# Patient Record
Sex: FEMALE | Race: WHITE | NOT HISPANIC OR LATINO
[De-identification: names, ages, dates, MRNs, and addresses within clinical notes are randomized per-mention and may not be internally consistent; named-entity substitution may affect disease eponyms.]

---

## 2017-11-29 ENCOUNTER — APPOINTMENT (OUTPATIENT)
Dept: OTOLARYNGOLOGY | Facility: CLINIC | Age: 42
End: 2017-11-29
Payer: COMMERCIAL

## 2017-11-29 VITALS
SYSTOLIC BLOOD PRESSURE: 133 MMHG | DIASTOLIC BLOOD PRESSURE: 82 MMHG | BODY MASS INDEX: 39.27 KG/M2 | WEIGHT: 200 LBS | HEIGHT: 60 IN

## 2017-11-29 DIAGNOSIS — H91.91 UNSPECIFIED HEARING LOSS, RIGHT EAR: ICD-10-CM

## 2017-11-29 DIAGNOSIS — R42 DIZZINESS AND GIDDINESS: ICD-10-CM

## 2017-11-29 DIAGNOSIS — Z82.2 FAMILY HISTORY OF DEAFNESS AND HEARING LOSS: ICD-10-CM

## 2017-11-29 DIAGNOSIS — Z83.3 FAMILY HISTORY OF DIABETES MELLITUS: ICD-10-CM

## 2017-11-29 PROCEDURE — 99204 OFFICE O/P NEW MOD 45 MIN: CPT | Mod: 25

## 2017-11-29 PROCEDURE — 92557 COMPREHENSIVE HEARING TEST: CPT

## 2017-11-29 PROCEDURE — 92567 TYMPANOMETRY: CPT

## 2017-11-29 RX ORDER — ASPIRIN 325 MG/1
325 TABLET ORAL
Refills: 0 | Status: ACTIVE | COMMUNITY

## 2017-12-02 ENCOUNTER — FORM ENCOUNTER (OUTPATIENT)
Age: 42
End: 2017-12-02

## 2017-12-03 ENCOUNTER — APPOINTMENT (OUTPATIENT)
Dept: CT IMAGING | Facility: IMAGING CENTER | Age: 42
End: 2017-12-03
Payer: COMMERCIAL

## 2017-12-03 ENCOUNTER — OUTPATIENT (OUTPATIENT)
Dept: OUTPATIENT SERVICES | Facility: HOSPITAL | Age: 42
LOS: 1 days | End: 2017-12-03
Payer: COMMERCIAL

## 2017-12-03 DIAGNOSIS — H71.21 CHOLESTEATOMA OF MASTOID, RIGHT EAR: ICD-10-CM

## 2017-12-03 PROCEDURE — 70480 CT ORBIT/EAR/FOSSA W/O DYE: CPT | Mod: 26

## 2017-12-03 PROCEDURE — 70480 CT ORBIT/EAR/FOSSA W/O DYE: CPT

## 2017-12-04 ENCOUNTER — TRANSCRIPTION ENCOUNTER (OUTPATIENT)
Age: 42
End: 2017-12-04

## 2017-12-08 ENCOUNTER — RESULT REVIEW (OUTPATIENT)
Age: 42
End: 2017-12-08

## 2017-12-15 ENCOUNTER — APPOINTMENT (OUTPATIENT)
Dept: OTOLARYNGOLOGY | Facility: CLINIC | Age: 42
End: 2017-12-15

## 2017-12-28 ENCOUNTER — APPOINTMENT (OUTPATIENT)
Dept: OTOLARYNGOLOGY | Facility: CLINIC | Age: 42
End: 2017-12-28
Payer: COMMERCIAL

## 2017-12-28 PROCEDURE — 92537 CALORIC VSTBLR TEST W/REC: CPT

## 2017-12-28 PROCEDURE — 92547 SUPPLEMENTAL ELECTRICAL TEST: CPT

## 2017-12-28 PROCEDURE — 92540 BASIC VESTIBULAR EVALUATION: CPT

## 2017-12-28 PROCEDURE — 92567 TYMPANOMETRY: CPT

## 2018-01-11 ENCOUNTER — MESSAGE (OUTPATIENT)
Age: 43
End: 2018-01-11

## 2018-01-22 ENCOUNTER — APPOINTMENT (OUTPATIENT)
Dept: OTOLARYNGOLOGY | Facility: CLINIC | Age: 43
End: 2018-01-22
Payer: COMMERCIAL

## 2018-01-22 VITALS
HEART RATE: 85 BPM | DIASTOLIC BLOOD PRESSURE: 72 MMHG | SYSTOLIC BLOOD PRESSURE: 106 MMHG | HEIGHT: 60 IN | BODY MASS INDEX: 39.27 KG/M2 | WEIGHT: 200 LBS

## 2018-01-22 PROCEDURE — 99214 OFFICE O/P EST MOD 30 MIN: CPT | Mod: 25

## 2018-01-22 PROCEDURE — 92567 TYMPANOMETRY: CPT

## 2018-01-22 PROCEDURE — 92557 COMPREHENSIVE HEARING TEST: CPT

## 2018-01-22 RX ORDER — CITALOPRAM 10 MG/1
TABLET, FILM COATED ORAL
Refills: 0 | Status: ACTIVE | COMMUNITY

## 2018-02-14 ENCOUNTER — APPOINTMENT (OUTPATIENT)
Dept: OTOLARYNGOLOGY | Facility: CLINIC | Age: 43
End: 2018-02-14
Payer: COMMERCIAL

## 2018-02-14 ENCOUNTER — OUTPATIENT (OUTPATIENT)
Dept: OUTPATIENT SERVICES | Facility: HOSPITAL | Age: 43
LOS: 1 days | End: 2018-02-14

## 2018-02-14 VITALS
WEIGHT: 195.99 LBS | OXYGEN SATURATION: 98 % | TEMPERATURE: 98 F | DIASTOLIC BLOOD PRESSURE: 80 MMHG | SYSTOLIC BLOOD PRESSURE: 110 MMHG | RESPIRATION RATE: 16 BRPM | HEART RATE: 77 BPM | HEIGHT: 60 IN

## 2018-02-14 DIAGNOSIS — Z98.890 OTHER SPECIFIED POSTPROCEDURAL STATES: Chronic | ICD-10-CM

## 2018-02-14 DIAGNOSIS — H71.21 CHOLESTEATOMA OF MASTOID, RIGHT EAR: ICD-10-CM

## 2018-02-14 DIAGNOSIS — G47.33 OBSTRUCTIVE SLEEP APNEA (ADULT) (PEDIATRIC): ICD-10-CM

## 2018-02-14 DIAGNOSIS — Z98.891 HISTORY OF UTERINE SCAR FROM PREVIOUS SURGERY: Chronic | ICD-10-CM

## 2018-02-14 LAB
BUN SERPL-MCNC: 22 MG/DL — SIGNIFICANT CHANGE UP (ref 7–23)
CALCIUM SERPL-MCNC: 8.8 MG/DL — SIGNIFICANT CHANGE UP (ref 8.4–10.5)
CHLORIDE SERPL-SCNC: 101 MMOL/L — SIGNIFICANT CHANGE UP (ref 98–107)
CO2 SERPL-SCNC: 23 MMOL/L — SIGNIFICANT CHANGE UP (ref 22–31)
CREAT SERPL-MCNC: 0.75 MG/DL — SIGNIFICANT CHANGE UP (ref 0.5–1.3)
GLUCOSE SERPL-MCNC: 130 MG/DL — HIGH (ref 70–99)
HCG SERPL-ACNC: < 5 MIU/ML — SIGNIFICANT CHANGE UP
HCT VFR BLD CALC: 35.3 % — SIGNIFICANT CHANGE UP (ref 34.5–45)
HGB BLD-MCNC: 12.1 G/DL — SIGNIFICANT CHANGE UP (ref 11.5–15.5)
MCHC RBC-ENTMCNC: 30.2 PG — SIGNIFICANT CHANGE UP (ref 27–34)
MCHC RBC-ENTMCNC: 34.3 % — SIGNIFICANT CHANGE UP (ref 32–36)
MCV RBC AUTO: 88 FL — SIGNIFICANT CHANGE UP (ref 80–100)
NRBC # FLD: 0 — SIGNIFICANT CHANGE UP
PLATELET # BLD AUTO: 274 K/UL — SIGNIFICANT CHANGE UP (ref 150–400)
PMV BLD: 10.1 FL — SIGNIFICANT CHANGE UP (ref 7–13)
POTASSIUM SERPL-MCNC: 3.8 MMOL/L — SIGNIFICANT CHANGE UP (ref 3.5–5.3)
POTASSIUM SERPL-SCNC: 3.8 MMOL/L — SIGNIFICANT CHANGE UP (ref 3.5–5.3)
RBC # BLD: 4.01 M/UL — SIGNIFICANT CHANGE UP (ref 3.8–5.2)
RBC # FLD: 13.1 % — SIGNIFICANT CHANGE UP (ref 10.3–14.5)
SODIUM SERPL-SCNC: 139 MMOL/L — SIGNIFICANT CHANGE UP (ref 135–145)
WBC # BLD: 8.56 K/UL — SIGNIFICANT CHANGE UP (ref 3.8–10.5)
WBC # FLD AUTO: 8.56 K/UL — SIGNIFICANT CHANGE UP (ref 3.8–10.5)

## 2018-02-14 PROCEDURE — 99213 OFFICE O/P EST LOW 20 MIN: CPT

## 2018-02-14 NOTE — H&P PST ADULT - FAMILY HISTORY
Sibling  Still living? Yes, Estimated age: 31-40  Family history of breast cancer in sister, Age at diagnosis: Age Unknown Sibling  Still living? Yes, Estimated age: 31-40  Family history of breast cancer in sister, Age at diagnosis: Age Unknown     Mother  Still living? No  Family history of MI (myocardial infarction), Age at diagnosis: Age Unknown  Family history of heart disease, Age at diagnosis: Age Unknown     Father  Still living? Yes, Estimated age: 61-70  Type 2 diabetes mellitus, Age at diagnosis: Age Unknown

## 2018-02-14 NOTE — H&P PST ADULT - RS GEN PE MLT RESP DETAILS PC
respirations non-labored/no intercostal retractions/clear to auscultation bilaterally/no rales/no chest wall tenderness/no rhonchi/breath sounds equal/airway patent/no subcutaneous emphysema/good air movement/no wheezes

## 2018-02-14 NOTE — H&P PST ADULT - ABILITY TO HEAR (WITH HEARING AID OR HEARING APPLIANCE IF NORMALLY USED):
right/Mildly to Moderately Impaired: difficulty hearing in some environments or speaker may need to increase volume or speak distinctly

## 2018-02-14 NOTE — H&P PST ADULT - NEGATIVE PSYCHIATRIC SYMPTOMS
no memory loss/no mood swings/no agitation/no suicidal ideation/no paranoia/no visual hallucinations/no depression/no insomnia/no auditory hallucinations

## 2018-02-14 NOTE — H&P PST ADULT - NEGATIVE ENMT SYMPTOMS
no abnormal taste sensation/no throat pain/no dysphagia/no nose bleeds/no recurrent cold sores/no dry mouth/no sinus symptoms/no nasal congestion no nose bleeds/no dry mouth/no throat pain/no dysphagia/no recurrent cold sores/no abnormal taste sensation/no sinus symptoms/no nasal congestion/no vertigo

## 2018-02-14 NOTE — H&P PST ADULT - PSH
H/O colonoscopy  2007  H/O dilation and curettage  x5 secondary to miscarriages  H/O endoscopy  2017  H/O sinus surgery  10/2005  History of  section  1005  S/P LASIK surgery of both eyes

## 2018-02-14 NOTE — H&P PST ADULT - NEGATIVE CARDIOVASCULAR SYMPTOMS
no palpitations/no orthopnea/no chest pain/no claudication/no peripheral edema/no dyspnea on exertion/no paroxysmal nocturnal dyspnea

## 2018-02-14 NOTE — H&P PST ADULT - GASTROINTESTINAL DETAILS
bowel sounds normal/soft/no bruit/no rebound tenderness/nontender/no distention/no masses palpable soft/no rigidity/no organomegaly/no rebound tenderness/no masses palpable/bowel sounds normal/no bruit/nontender/no distention/no guarding

## 2018-02-14 NOTE — H&P PST ADULT - HISTORY OF PRESENT ILLNESS
intermittent right ear pain since 2015, constant   S/P Ct scan of head11/2017. 43 y/o  female with PMH: SHOBHA, Obesity, GERD presents to PST for pre op evaluation with hx of intermittent right ear pain since 2015. Pt stated that pain has gradually gotten constant. Pt was evaluated by surgeon. S/P Ct scan of head on 11/2017. Now scheduled for right mastoidectomy with ossicular chain reconstruction with fascia graft, tympanoplasty on 02/21/18

## 2018-02-14 NOTE — H&P PST ADULT - PMH
Cholesteatoma of mastoid, right ear    GERD (gastroesophageal reflux disease)    Obesity (BMI 30-39.9)    SHOBHA (obstructive sleep apnea)

## 2018-02-20 ENCOUNTER — TRANSCRIPTION ENCOUNTER (OUTPATIENT)
Age: 43
End: 2018-02-20

## 2018-02-21 ENCOUNTER — OUTPATIENT (OUTPATIENT)
Dept: OUTPATIENT SERVICES | Facility: HOSPITAL | Age: 43
LOS: 1 days | Discharge: HOME | End: 2018-02-21
Payer: COMMERCIAL

## 2018-02-21 ENCOUNTER — APPOINTMENT (OUTPATIENT)
Dept: OTOLARYNGOLOGY | Facility: HOSPITAL | Age: 43
End: 2018-02-21

## 2018-02-21 ENCOUNTER — RESULT REVIEW (OUTPATIENT)
Age: 43
End: 2018-02-21

## 2018-02-21 VITALS
RESPIRATION RATE: 14 BRPM | HEART RATE: 82 BPM | SYSTOLIC BLOOD PRESSURE: 92 MMHG | OXYGEN SATURATION: 97 % | DIASTOLIC BLOOD PRESSURE: 71 MMHG

## 2018-02-21 VITALS
WEIGHT: 195.99 LBS | HEIGHT: 60 IN | RESPIRATION RATE: 18 BRPM | HEART RATE: 78 BPM | DIASTOLIC BLOOD PRESSURE: 49 MMHG | TEMPERATURE: 98 F | SYSTOLIC BLOOD PRESSURE: 121 MMHG | OXYGEN SATURATION: 100 %

## 2018-02-21 DIAGNOSIS — Z98.890 OTHER SPECIFIED POSTPROCEDURAL STATES: Chronic | ICD-10-CM

## 2018-02-21 DIAGNOSIS — Z98.891 HISTORY OF UTERINE SCAR FROM PREVIOUS SURGERY: Chronic | ICD-10-CM

## 2018-02-21 DIAGNOSIS — H71.21 CHOLESTEATOMA OF MASTOID, RIGHT EAR: ICD-10-CM

## 2018-02-21 PROCEDURE — 88300 SURGICAL PATH GROSS: CPT | Mod: 26,59

## 2018-02-21 PROCEDURE — 69644 REVISE MIDDLE EAR & MASTOID: CPT | Mod: RT

## 2018-02-21 PROCEDURE — 92516 FACIAL NERVE FUNCTION TEST: CPT | Mod: RT

## 2018-02-21 PROCEDURE — 20926: CPT | Mod: RT

## 2018-02-21 PROCEDURE — 88304 TISSUE EXAM BY PATHOLOGIST: CPT | Mod: 26

## 2018-02-21 RX ORDER — AZITHROMYCIN 500 MG/1
1 TABLET, FILM COATED ORAL
Qty: 6 | Refills: 0
Start: 2018-02-21 | End: 2018-02-25

## 2018-02-21 RX ORDER — ACETAMINOPHEN WITH CODEINE 300MG-30MG
1 TABLET ORAL
Qty: 20 | Refills: 0
Start: 2018-02-21 | End: 2018-02-25

## 2018-02-21 RX ORDER — ACETAMINOPHEN WITH CODEINE 300MG-30MG
1 TABLET ORAL
Qty: 20 | Refills: 0 | OUTPATIENT
Start: 2018-02-21 | End: 2018-02-25

## 2018-02-21 NOTE — ASU DISCHARGE PLAN (ADULT/PEDIATRIC). - NOTIFY
call for fever of 100.8 or greater/Pain not relieved by Medications/Swelling that continues/Numbness, color, or temperature change to extremity/Bleeding that does not stop/Unable to Urinate/Persistent Nausea and Vomiting

## 2018-02-27 LAB — SURGICAL PATHOLOGY STUDY: SIGNIFICANT CHANGE UP

## 2018-03-05 ENCOUNTER — APPOINTMENT (OUTPATIENT)
Dept: OTOLARYNGOLOGY | Facility: CLINIC | Age: 43
End: 2018-03-05
Payer: COMMERCIAL

## 2018-03-05 VITALS
HEIGHT: 60 IN | BODY MASS INDEX: 38.48 KG/M2 | HEART RATE: 87 BPM | SYSTOLIC BLOOD PRESSURE: 109 MMHG | DIASTOLIC BLOOD PRESSURE: 72 MMHG | WEIGHT: 196 LBS

## 2018-03-05 PROCEDURE — 99024 POSTOP FOLLOW-UP VISIT: CPT

## 2018-03-08 ENCOUNTER — APPOINTMENT (OUTPATIENT)
Dept: OTOLARYNGOLOGY | Facility: CLINIC | Age: 43
End: 2018-03-08
Payer: COMMERCIAL

## 2018-03-08 VITALS
HEIGHT: 60 IN | DIASTOLIC BLOOD PRESSURE: 79 MMHG | WEIGHT: 196 LBS | BODY MASS INDEX: 38.48 KG/M2 | SYSTOLIC BLOOD PRESSURE: 116 MMHG

## 2018-03-08 DIAGNOSIS — T81.4XXA INFECTION FOLLOWING A PROCEDURE, INITIAL ENCOUNTER: ICD-10-CM

## 2018-03-08 PROCEDURE — 99024 POSTOP FOLLOW-UP VISIT: CPT

## 2018-03-26 ENCOUNTER — APPOINTMENT (OUTPATIENT)
Dept: OTOLARYNGOLOGY | Facility: CLINIC | Age: 43
End: 2018-03-26
Payer: COMMERCIAL

## 2018-03-26 PROCEDURE — 92557 COMPREHENSIVE HEARING TEST: CPT

## 2018-03-26 PROCEDURE — 99024 POSTOP FOLLOW-UP VISIT: CPT

## 2018-03-26 PROCEDURE — 92567 TYMPANOMETRY: CPT

## 2018-04-10 RX ORDER — CLINDAMYCIN HYDROCHLORIDE 300 MG/1
300 CAPSULE ORAL
Qty: 21 | Refills: 0 | Status: COMPLETED | COMMUNITY
Start: 2018-03-08 | End: 2018-04-10

## 2018-05-14 ENCOUNTER — APPOINTMENT (OUTPATIENT)
Dept: OTOLARYNGOLOGY | Facility: CLINIC | Age: 43
End: 2018-05-14
Payer: COMMERCIAL

## 2018-05-14 VITALS
BODY MASS INDEX: 38.48 KG/M2 | DIASTOLIC BLOOD PRESSURE: 75 MMHG | WEIGHT: 196 LBS | HEIGHT: 60 IN | SYSTOLIC BLOOD PRESSURE: 114 MMHG

## 2018-05-14 PROCEDURE — 99213 OFFICE O/P EST LOW 20 MIN: CPT | Mod: 25

## 2018-05-14 PROCEDURE — 92557 COMPREHENSIVE HEARING TEST: CPT

## 2018-05-14 PROCEDURE — 92567 TYMPANOMETRY: CPT

## 2018-08-08 ENCOUNTER — APPOINTMENT (OUTPATIENT)
Dept: OTOLARYNGOLOGY | Facility: CLINIC | Age: 43
End: 2018-08-08

## 2019-02-22 NOTE — PACU DISCHARGE NOTE - NAUSEA/VOMITING:
Quality 110: Preventive Care And Screening: Influenza Immunization: Influenza Immunization previously received during influenza season Detail Level: Detailed None

## 2019-04-12 PROBLEM — K21.9 GASTRO-ESOPHAGEAL REFLUX DISEASE WITHOUT ESOPHAGITIS: Chronic | Status: ACTIVE | Noted: 2018-02-14

## 2019-04-12 PROBLEM — E66.9 OBESITY, UNSPECIFIED: Chronic | Status: ACTIVE | Noted: 2018-02-14

## 2019-04-12 PROBLEM — G47.33 OBSTRUCTIVE SLEEP APNEA (ADULT) (PEDIATRIC): Chronic | Status: ACTIVE | Noted: 2018-02-14

## 2019-04-12 PROBLEM — H71.21 CHOLESTEATOMA OF MASTOID, RIGHT EAR: Chronic | Status: ACTIVE | Noted: 2018-02-14

## 2019-04-16 ENCOUNTER — TRANSCRIPTION ENCOUNTER (OUTPATIENT)
Age: 44
End: 2019-04-16

## 2019-04-16 ENCOUNTER — APPOINTMENT (OUTPATIENT)
Dept: OTOLARYNGOLOGY | Facility: CLINIC | Age: 44
End: 2019-04-16
Payer: COMMERCIAL

## 2019-04-16 VITALS
WEIGHT: 196 LBS | HEIGHT: 60 IN | HEART RATE: 82 BPM | SYSTOLIC BLOOD PRESSURE: 105 MMHG | BODY MASS INDEX: 38.48 KG/M2 | DIASTOLIC BLOOD PRESSURE: 75 MMHG

## 2019-04-16 DIAGNOSIS — H65.93 UNSPECIFIED NONSUPPURATIVE OTITIS MEDIA, BILATERAL: ICD-10-CM

## 2019-04-16 DIAGNOSIS — H71.21 CHOLESTEATOMA OF MASTOID, RIGHT EAR: ICD-10-CM

## 2019-04-16 PROCEDURE — 92504 EAR MICROSCOPY EXAMINATION: CPT

## 2019-04-16 PROCEDURE — 99213 OFFICE O/P EST LOW 20 MIN: CPT | Mod: 25

## 2019-04-16 RX ORDER — FLUTICASONE PROPIONATE 50 UG/1
50 SPRAY, METERED NASAL DAILY
Qty: 3 | Refills: 3 | Status: DISCONTINUED | COMMUNITY
Start: 2018-03-26 | End: 2019-04-16

## 2019-04-16 RX ORDER — RANITIDINE 300 MG/1
300 TABLET ORAL
Qty: 30 | Refills: 0 | Status: ACTIVE | COMMUNITY
Start: 2019-01-03

## 2019-04-16 RX ORDER — DOXYCYCLINE HYCLATE 100 MG/1
100 TABLET ORAL
Qty: 20 | Refills: 0 | Status: ACTIVE | COMMUNITY
Start: 2019-01-03

## 2019-04-16 RX ORDER — PROMETHAZINE HYDROCHLORIDE AND DEXTROMETHORPHAN HYDROBROMIDE ORAL SOLUTION 15; 6.25 MG/5ML; MG/5ML
6.25-15 SOLUTION ORAL
Qty: 180 | Refills: 0 | Status: ACTIVE | COMMUNITY
Start: 2019-01-03

## 2019-04-16 RX ORDER — CITALOPRAM HYDROBROMIDE 40 MG/1
40 TABLET, FILM COATED ORAL
Qty: 60 | Refills: 0 | Status: ACTIVE | COMMUNITY
Start: 2019-02-19

## 2019-04-16 RX ORDER — CEFDINIR 300 MG/1
300 CAPSULE ORAL
Qty: 20 | Refills: 0 | Status: DISCONTINUED | COMMUNITY
Start: 2019-04-11

## 2019-04-16 RX ORDER — CIPROFLOXACIN AND DEXAMETHASONE 3; 1 MG/ML; MG/ML
0.3-0.1 SUSPENSION/ DROPS AURICULAR (OTIC) TWICE DAILY
Qty: 2 | Refills: 3 | Status: DISCONTINUED | COMMUNITY
Start: 2018-03-05 | End: 2019-04-16

## 2019-04-16 NOTE — PHYSICAL EXAM
[Binocular Microscopic Exam] : Binocular microscopic exam was performed [Hearing Loss Right Only] : normal [Hearing Loss Left Only] : normal [de-identified] : lateral malleus with 3 small keratin riki pearls, removed with connor needle/alligator [de-identified] : + resolving effusion/stranding [de-identified] : + resolving effusion/stranding [Normal] : no rashes

## 2019-04-16 NOTE — HISTORY OF PRESENT ILLNESS
[de-identified] : f/u eva COM\par R riki s/p tmastoid last yr\par was doing ok\par recently, several days ago with bile ear fullness and pain\par no otorrhea\par no changes in hearing\par was told by a colleague who looked into the ear she had pus there

## 2019-04-16 NOTE — PROCEDURE
[Same] : same as the Pre Op Dx. [] : Binocular Microscopy [FreeTextEntry1] : GUILLE lyn [FreeTextEntry4] : none [FreeTextEntry6] : Operative microscope was used to examine/treat the ear canal, ear drum and visible middle ear landmarks. Adequate exam/treatment would not have been possible without the use of a microscope. Findings are described.\par \par

## 2019-04-16 NOTE — REASON FOR VISIT
[Subsequent Evaluation] : a subsequent evaluation for [FreeTextEntry2] : Pain and fluid/pus in ears

## 2019-05-01 ENCOUNTER — APPOINTMENT (OUTPATIENT)
Dept: OTOLARYNGOLOGY | Facility: CLINIC | Age: 44
End: 2019-05-01
Payer: COMMERCIAL

## 2019-05-01 VITALS
DIASTOLIC BLOOD PRESSURE: 71 MMHG | HEIGHT: 60 IN | BODY MASS INDEX: 38.48 KG/M2 | WEIGHT: 196 LBS | HEART RATE: 99 BPM | SYSTOLIC BLOOD PRESSURE: 101 MMHG

## 2019-05-01 DIAGNOSIS — J02.8 ACUTE PHARYNGITIS DUE TO OTHER SPECIFIED ORGANISMS: ICD-10-CM

## 2019-05-01 DIAGNOSIS — R09.82 POSTNASAL DRIP: ICD-10-CM

## 2019-05-01 DIAGNOSIS — H90.A31 MIXED CONDUCTIVE AND SENSORINEURAL HEARING LOSS, UNILATERAL, RIGHT EAR WITH RESTRICTED HEARING ON THE  CONTRALATERAL SIDE: ICD-10-CM

## 2019-05-01 PROCEDURE — 92557 COMPREHENSIVE HEARING TEST: CPT

## 2019-05-01 PROCEDURE — 92567 TYMPANOMETRY: CPT

## 2019-05-01 PROCEDURE — 99214 OFFICE O/P EST MOD 30 MIN: CPT | Mod: 25

## 2019-05-01 RX ORDER — DOXYCYCLINE 100 MG/1
100 CAPSULE ORAL
Qty: 20 | Refills: 0 | Status: ACTIVE | COMMUNITY
Start: 2019-05-01 | End: 1900-01-01

## 2019-05-01 NOTE — PHYSICAL EXAM
[Midline] : trachea located in midline position [Normal] : no rashes [de-identified] : right retracted TM, mild OM, LENNOX, AS normal  [de-identified] : pharyngitis

## 2019-05-01 NOTE — REASON FOR VISIT
[Subsequent Evaluation] : a subsequent evaluation for [Spouse] : spouse [FreeTextEntry2] : f/u b/l hearing loss and pain

## 2019-05-01 NOTE — HISTORY OF PRESENT ILLNESS
[de-identified] : 42 y/o female here for f/u for b/l ear pain and hearing loss. Pt with h/o right tympmastoid  in 2/2018.  Pt c/o of feeling her throat is closing- hard time swallowing yesterday- better today. \par \par Pt c/o of feeling dizzy- feeling off balance. \par Pt with "clicking" and feeling of "wetness" in both ears- Afrin and Sudfed didn’t help.

## 2019-06-04 RX ORDER — MONTELUKAST 10 MG/1
10 TABLET, FILM COATED ORAL DAILY
Qty: 90 | Refills: 1 | Status: ACTIVE | COMMUNITY
Start: 2019-05-01 | End: 1900-01-01

## 2019-08-23 ENCOUNTER — APPOINTMENT (OUTPATIENT)
Dept: OBGYN | Facility: CLINIC | Age: 44
End: 2019-08-23
Payer: COMMERCIAL

## 2019-08-23 PROCEDURE — 99243 OFF/OP CNSLTJ NEW/EST LOW 30: CPT

## 2019-12-30 ENCOUNTER — INPATIENT (INPATIENT)
Facility: HOSPITAL | Age: 44
LOS: 1 days | Discharge: HOME | DRG: 287 | End: 2020-01-01
Attending: INTERNAL MEDICINE | Admitting: INTERNAL MEDICINE
Payer: COMMERCIAL

## 2019-12-30 VITALS
HEART RATE: 100 BPM | HEIGHT: 60 IN | TEMPERATURE: 98 F | OXYGEN SATURATION: 89 % | RESPIRATION RATE: 18 BRPM | WEIGHT: 199.96 LBS | DIASTOLIC BLOOD PRESSURE: 75 MMHG | SYSTOLIC BLOOD PRESSURE: 117 MMHG

## 2019-12-30 DIAGNOSIS — K21.9 GASTRO-ESOPHAGEAL REFLUX DISEASE WITHOUT ESOPHAGITIS: ICD-10-CM

## 2019-12-30 DIAGNOSIS — Z98.890 OTHER SPECIFIED POSTPROCEDURAL STATES: Chronic | ICD-10-CM

## 2019-12-30 DIAGNOSIS — R73.9 HYPERGLYCEMIA, UNSPECIFIED: ICD-10-CM

## 2019-12-30 DIAGNOSIS — Q24.5 MALFORMATION OF CORONARY VESSELS: ICD-10-CM

## 2019-12-30 DIAGNOSIS — Z02.9 ENCOUNTER FOR ADMINISTRATIVE EXAMINATIONS, UNSPECIFIED: ICD-10-CM

## 2019-12-30 DIAGNOSIS — Z98.891 HISTORY OF UTERINE SCAR FROM PREVIOUS SURGERY: Chronic | ICD-10-CM

## 2019-12-30 DIAGNOSIS — R07.9 CHEST PAIN, UNSPECIFIED: ICD-10-CM

## 2019-12-30 LAB
ALBUMIN SERPL ELPH-MCNC: 4.4 G/DL — SIGNIFICANT CHANGE UP (ref 3.3–5)
ALP SERPL-CCNC: 106 U/L — SIGNIFICANT CHANGE UP (ref 40–120)
ALT FLD-CCNC: 19 U/L — SIGNIFICANT CHANGE UP (ref 10–45)
ANION GAP SERPL CALC-SCNC: 14 MMOL/L — SIGNIFICANT CHANGE UP (ref 5–17)
APTT BLD: 32.4 SEC — SIGNIFICANT CHANGE UP (ref 27.5–36.3)
AST SERPL-CCNC: 12 U/L — SIGNIFICANT CHANGE UP (ref 10–40)
BASE EXCESS BLDV CALC-SCNC: 1.7 MMOL/L — SIGNIFICANT CHANGE UP (ref -2–2)
BASOPHILS # BLD AUTO: 0.03 K/UL — SIGNIFICANT CHANGE UP (ref 0–0.2)
BASOPHILS NFR BLD AUTO: 0.3 % — SIGNIFICANT CHANGE UP (ref 0–2)
BILIRUB SERPL-MCNC: 0.2 MG/DL — SIGNIFICANT CHANGE UP (ref 0.2–1.2)
BUN SERPL-MCNC: 18 MG/DL — SIGNIFICANT CHANGE UP (ref 7–23)
CA-I SERPL-SCNC: 1.22 MMOL/L — SIGNIFICANT CHANGE UP (ref 1.12–1.3)
CALCIUM SERPL-MCNC: 9.5 MG/DL — SIGNIFICANT CHANGE UP (ref 8.4–10.5)
CHLORIDE BLDV-SCNC: 104 MMOL/L — SIGNIFICANT CHANGE UP (ref 96–108)
CHLORIDE SERPL-SCNC: 99 MMOL/L — SIGNIFICANT CHANGE UP (ref 96–108)
CK MB CFR SERPL CALC: 2.4 NG/ML — SIGNIFICANT CHANGE UP (ref 0–3.8)
CK SERPL-CCNC: 73 U/L — SIGNIFICANT CHANGE UP (ref 25–170)
CO2 BLDV-SCNC: 29 MMOL/L — SIGNIFICANT CHANGE UP (ref 22–30)
CO2 SERPL-SCNC: 24 MMOL/L — SIGNIFICANT CHANGE UP (ref 22–31)
CREAT SERPL-MCNC: 0.71 MG/DL — SIGNIFICANT CHANGE UP (ref 0.5–1.3)
EOSINOPHIL # BLD AUTO: 0.1 K/UL — SIGNIFICANT CHANGE UP (ref 0–0.5)
EOSINOPHIL NFR BLD AUTO: 1.1 % — SIGNIFICANT CHANGE UP (ref 0–6)
GAS PNL BLDV: 138 MMOL/L — SIGNIFICANT CHANGE UP (ref 135–145)
GAS PNL BLDV: SIGNIFICANT CHANGE UP
GAS PNL BLDV: SIGNIFICANT CHANGE UP
GLUCOSE BLDV-MCNC: 126 MG/DL — HIGH (ref 70–99)
GLUCOSE SERPL-MCNC: 130 MG/DL — HIGH (ref 70–99)
HCG SERPL-ACNC: <2 MIU/ML — SIGNIFICANT CHANGE UP
HCO3 BLDV-SCNC: 27 MMOL/L — SIGNIFICANT CHANGE UP (ref 21–29)
HCT VFR BLD CALC: 33.2 % — LOW (ref 34.5–45)
HCT VFR BLDA CALC: 40 % — SIGNIFICANT CHANGE UP (ref 39–50)
HGB BLD CALC-MCNC: 13 G/DL — SIGNIFICANT CHANGE UP (ref 11.5–15.5)
HGB BLD-MCNC: 10.9 G/DL — LOW (ref 11.5–15.5)
IMM GRANULOCYTES NFR BLD AUTO: 0.3 % — SIGNIFICANT CHANGE UP (ref 0–1.5)
INR BLD: 1.04 RATIO — SIGNIFICANT CHANGE UP (ref 0.88–1.16)
LACTATE BLDV-MCNC: 1.3 MMOL/L — SIGNIFICANT CHANGE UP (ref 0.7–2)
LYMPHOCYTES # BLD AUTO: 2.35 K/UL — SIGNIFICANT CHANGE UP (ref 1–3.3)
LYMPHOCYTES # BLD AUTO: 25.5 % — SIGNIFICANT CHANGE UP (ref 13–44)
MAGNESIUM SERPL-MCNC: 1.9 MG/DL — SIGNIFICANT CHANGE UP (ref 1.6–2.6)
MCHC RBC-ENTMCNC: 28.8 PG — SIGNIFICANT CHANGE UP (ref 27–34)
MCHC RBC-ENTMCNC: 32.8 GM/DL — SIGNIFICANT CHANGE UP (ref 32–36)
MCV RBC AUTO: 87.6 FL — SIGNIFICANT CHANGE UP (ref 80–100)
MONOCYTES # BLD AUTO: 0.67 K/UL — SIGNIFICANT CHANGE UP (ref 0–0.9)
MONOCYTES NFR BLD AUTO: 7.3 % — SIGNIFICANT CHANGE UP (ref 2–14)
NEUTROPHILS # BLD AUTO: 6.05 K/UL — SIGNIFICANT CHANGE UP (ref 1.8–7.4)
NEUTROPHILS NFR BLD AUTO: 65.5 % — SIGNIFICANT CHANGE UP (ref 43–77)
NRBC # BLD: 0 /100 WBCS — SIGNIFICANT CHANGE UP (ref 0–0)
NT-PROBNP SERPL-SCNC: 21 PG/ML — SIGNIFICANT CHANGE UP (ref 0–300)
PCO2 BLDV: 49 MMHG — SIGNIFICANT CHANGE UP (ref 35–50)
PH BLDV: 7.36 — SIGNIFICANT CHANGE UP (ref 7.35–7.45)
PLATELET # BLD AUTO: 222 K/UL — SIGNIFICANT CHANGE UP (ref 150–400)
PO2 BLDV: 29 MMHG — SIGNIFICANT CHANGE UP (ref 25–45)
POTASSIUM BLDV-SCNC: 3.5 MMOL/L — SIGNIFICANT CHANGE UP (ref 3.5–5.3)
POTASSIUM SERPL-MCNC: 3.5 MMOL/L — SIGNIFICANT CHANGE UP (ref 3.5–5.3)
POTASSIUM SERPL-SCNC: 3.5 MMOL/L — SIGNIFICANT CHANGE UP (ref 3.5–5.3)
PROT SERPL-MCNC: 7.8 G/DL — SIGNIFICANT CHANGE UP (ref 6–8.3)
PROTHROM AB SERPL-ACNC: 12 SEC — SIGNIFICANT CHANGE UP (ref 10–12.9)
RBC # BLD: 3.79 M/UL — LOW (ref 3.8–5.2)
RBC # FLD: 12.6 % — SIGNIFICANT CHANGE UP (ref 10.3–14.5)
SAO2 % BLDV: 48 % — LOW (ref 67–88)
SODIUM SERPL-SCNC: 137 MMOL/L — SIGNIFICANT CHANGE UP (ref 135–145)
TROPONIN T, HIGH SENSITIVITY RESULT: <6 NG/L — SIGNIFICANT CHANGE UP (ref 0–51)
WBC # BLD: 9.23 K/UL — SIGNIFICANT CHANGE UP (ref 3.8–10.5)
WBC # FLD AUTO: 9.23 K/UL — SIGNIFICANT CHANGE UP (ref 3.8–10.5)

## 2019-12-30 PROCEDURE — 74174 CTA ABD&PLVS W/CONTRAST: CPT | Mod: 26

## 2019-12-30 PROCEDURE — 71046 X-RAY EXAM CHEST 2 VIEWS: CPT | Mod: 26

## 2019-12-30 PROCEDURE — 99291 CRITICAL CARE FIRST HOUR: CPT

## 2019-12-30 PROCEDURE — 71275 CT ANGIOGRAPHY CHEST: CPT | Mod: 26

## 2019-12-30 PROCEDURE — 93010 ELECTROCARDIOGRAM REPORT: CPT

## 2019-12-30 PROCEDURE — 99223 1ST HOSP IP/OBS HIGH 75: CPT

## 2019-12-30 RX ORDER — ONDANSETRON 8 MG/1
4 TABLET, FILM COATED ORAL ONCE
Refills: 0 | Status: COMPLETED | OUTPATIENT
Start: 2019-12-30 | End: 2019-12-30

## 2019-12-30 RX ORDER — CITALOPRAM 10 MG/1
40 TABLET, FILM COATED ORAL DAILY
Refills: 0 | Status: DISCONTINUED | OUTPATIENT
Start: 2019-12-30 | End: 2020-01-01

## 2019-12-30 RX ORDER — FENTANYL CITRATE 50 UG/ML
50 INJECTION INTRAVENOUS ONCE
Refills: 0 | Status: DISCONTINUED | OUTPATIENT
Start: 2019-12-30 | End: 2019-12-30

## 2019-12-30 RX ORDER — ESOMEPRAZOLE MAGNESIUM 40 MG/1
1 CAPSULE, DELAYED RELEASE ORAL
Qty: 0 | Refills: 0 | DISCHARGE

## 2019-12-30 RX ORDER — ASPIRIN/CALCIUM CARB/MAGNESIUM 324 MG
1 TABLET ORAL
Qty: 0 | Refills: 0 | DISCHARGE

## 2019-12-30 RX ORDER — ENOXAPARIN SODIUM 100 MG/ML
40 INJECTION SUBCUTANEOUS EVERY 24 HOURS
Refills: 0 | Status: DISCONTINUED | OUTPATIENT
Start: 2019-12-30 | End: 2020-01-01

## 2019-12-30 RX ORDER — PANTOPRAZOLE SODIUM 20 MG/1
40 TABLET, DELAYED RELEASE ORAL
Refills: 0 | Status: DISCONTINUED | OUTPATIENT
Start: 2019-12-30 | End: 2020-01-01

## 2019-12-30 RX ORDER — CITALOPRAM 10 MG/1
1 TABLET, FILM COATED ORAL
Qty: 0 | Refills: 0 | DISCHARGE

## 2019-12-30 RX ORDER — METOPROLOL TARTRATE 50 MG
25 TABLET ORAL DAILY
Refills: 0 | Status: DISCONTINUED | OUTPATIENT
Start: 2019-12-31 | End: 2020-01-01

## 2019-12-30 RX ORDER — MORPHINE SULFATE 50 MG/1
0.5 CAPSULE, EXTENDED RELEASE ORAL ONCE
Refills: 0 | Status: DISCONTINUED | OUTPATIENT
Start: 2019-12-30 | End: 2019-12-30

## 2019-12-30 RX ORDER — FAMOTIDINE 10 MG/ML
40 INJECTION INTRAVENOUS ONCE
Refills: 0 | Status: COMPLETED | OUTPATIENT
Start: 2019-12-30 | End: 2019-12-30

## 2019-12-30 RX ORDER — LABETALOL HCL 100 MG
10 TABLET ORAL ONCE
Refills: 0 | Status: COMPLETED | OUTPATIENT
Start: 2019-12-30 | End: 2019-12-30

## 2019-12-30 RX ADMIN — FENTANYL CITRATE 50 MICROGRAM(S): 50 INJECTION INTRAVENOUS at 17:05

## 2019-12-30 RX ADMIN — FENTANYL CITRATE 50 MICROGRAM(S): 50 INJECTION INTRAVENOUS at 17:04

## 2019-12-30 RX ADMIN — MORPHINE SULFATE 0.5 MILLIGRAM(S): 50 CAPSULE, EXTENDED RELEASE ORAL at 23:46

## 2019-12-30 RX ADMIN — Medication 10 MILLIGRAM(S): at 17:05

## 2019-12-30 RX ADMIN — Medication 30 MILLILITER(S): at 18:15

## 2019-12-30 RX ADMIN — ONDANSETRON 4 MILLIGRAM(S): 8 TABLET, FILM COATED ORAL at 17:04

## 2019-12-30 RX ADMIN — FAMOTIDINE 40 MILLIGRAM(S): 10 INJECTION INTRAVENOUS at 18:15

## 2019-12-30 NOTE — H&P ADULT - NSHPREVIEWOFSYSTEMS_GEN_ALL_CORE
No fever, no chills, no rigors.  NO tearing back pain, no back pain.  NO chest pressure at present.  No palpitations.  No HA< no focal weakness.  NO rash.    No dysuria, no hematuria  No weight loss or anorexia.  NO joint pain.  NO vaginal bleeding.  NO SI/HI.

## 2019-12-30 NOTE — ED PROVIDER NOTE - CLINICAL SUMMARY MEDICAL DECISION MAKING FREE TEXT BOX
Attending MD Harris: 44F presenting from cardiologist's office for concern for Type A aortic dissection seen on TTE in office, patient had onset of chest pain and left arm numbness the night prior to arrival, peripheral pulses palpable in all 4 extremities, BP 120s systolic, patient in no apparent distress. Unusual presentation for dissection but given reported TTE findings, plan for stat CTA to evaluate. ECG, biomarkers, analgesia and anti-emetics

## 2019-12-30 NOTE — ED PROVIDER NOTE - CRITICAL CARE PROVIDED
interpretation of diagnostic studies/consultation with other physicians/direct patient care (not related to procedure)/additional history taking

## 2019-12-30 NOTE — ED ADULT NURSE NOTE - ISOLATION TYPE:
WOUND CARE: PT SEEN FOR FULL ASSESSMENT OF SACRAL/BUTTOCKS AREA AND BACK. SACRAL SCARRING 
NOTED WITH VERY BONY AREA, PRESENT ON ADMISSION. RECOMMENDATIONS MADE FOR SKIN PROTECTION. 
DISCUSSED WITH NURSING STAFF. WILL SEE PRN. None

## 2019-12-30 NOTE — H&P ADULT - ATTENDING COMMENTS
NIGHT HOSPITALIST:   Patient/ spouse aware of course and agree with plan/care as above.   Emotional support provided to patient/ spouse.   Care reviewed with cardiology.   [place] # [id number] in chart.   Care reviewed with covering NP/PA.  Care assumed by Dr. Ortiz.    Dimitri Gaona MD  [phone]

## 2019-12-30 NOTE — H&P ADULT - NSHPPHYSICALEXAM_GEN_ALL_CORE
Physical exam with a middle aged F, appears younger than stated age.   Obese.    Afebrile.   HR  97    RR 14.  BP  128//75   100% on RA    HEENT< PERRL< EOMI, oropharynx clear  Neck supple  No thyromegaly Physical exam with a middle aged F, appears younger than stated age.   Obese.    Afebrile.   HR  97    RR 14.  BP  128//75   100% on RA    HEENT< PERRL< EOMI, oropharynx clear  Neck supple  No thyromegaly  Chest clear  Cor s1 s2  Declined breast exam  Abdomen soft nontender, normal bowel sounds, no rebound  Ext no c/c/e  skin dry.  Neurologic AxOx3.  Speech fluent.  cognition intact.  UE/LE 5/5.  NO SI/HI.

## 2019-12-30 NOTE — H&P ADULT - PROBLEM SELECTOR PLAN 2
Presumed GERD.   PPI for now.   Suspect that symptoms of presumed GERD may have been from the myocardial bridge.

## 2019-12-30 NOTE — H&P ADULT - PROBLEM SELECTOR PLAN 4
Transitions of Care Status:  1.  Name of PCP:   Anmol Ivy MD  2.  PCP Contacted on Admission: [x ] Y    [ ] N    3.  PCP contacted at Discharge: [ ] Y    [ ] N    [ ] N/A  4.  Post-Discharge Appointment Date and Location:  5.  Summary of Handoff given to PCP:

## 2019-12-30 NOTE — H&P ADULT - NSHPSOCIALHISTORY_GEN_ALL_CORE
No tobacco or ethanol history.   Supportive spouse.   Five pregnancies with 4 spontaneous fetal loss, one heavy son via C section (now 14 years old).

## 2019-12-30 NOTE — H&P ADULT - HISTORY OF PRESENT ILLNESS
NIGHT HOSPITALIST:  Patient UNKNOWN to me previously, assigned to me at this point via the ER and by Dr. Ortiz to admit this 45 y/o F--patient seen with spouse in attendance--patient with a history of presumed GERD, depression with the patient self referring to [facility] following referral by her cardiologist above--patient with symptoms from last night of substernal chest pressure with LEFT arm pain not clearly related to activity nor meals.   Patient with presumed GERD and was self medicating with a PPI with no relief, and was sent from the cardiology office for concern for aortic dissection. NIGHT HOSPITALIST:  Patient UNKNOWN to me previously, assigned to me at this point via the ER and by Dr. Ortiz to admit this 43 y/o F--patient seen with spouse in attendance--patient with a history of presumed GERD, depression with the patient self referring to [facility] following referral by her cardiologist above--patient with symptoms from last night of substernal chest pressure with LEFT arm pain not clearly related to activity nor meals.   Patient with rare Librax but symptoms with no improvement.  Patient with presumed GERD and was self medicating with a PPI with no relief, and was sent from the cardiology office for concern for aortic dissection.

## 2019-12-30 NOTE — H&P ADULT - NSICDXFAMILYHX_GEN_ALL_CORE_FT
FAMILY HISTORY:  Family history of heart disease  Family history of MI (myocardial infarction)  Type 2 diabetes mellitus    Sibling  Still living? Yes, Estimated age: 31-40  Family history of breast cancer in sister, Age at diagnosis: Age Unknown

## 2019-12-30 NOTE — ED PROVIDER NOTE - PHYSICAL EXAMINATION
VITALS: reviewed  GEN: NAD, A & O x 4  HEAD/EYES: NCAT, PERRL, EOMI, anicteric sclerae, no conjunctival pallor  ENT: mucus membranes moist, oropharynx WNL, trachea midlin  RESP: lungs CTA with equal breath sounds bilaterally, chest wall nontender and atraumatic  CV: heart with reg rhythm S1, S2, distal pulses intact and symmetric bilaterally  ABDOMEN: normoactive bowel sounds, soft, nondistended, nontender, no palpable masses  : no CVAT  MSK: extremities atraumatic and nontender, no edema, no asymmetry.   SKIN: warm, dry, no rash, no bruising, no cyanosis. color appropriate for ethnicity  NEURO: alert, mentating appropriately, no facial asymmetry. gross sensation, motor, coordination are intact  PSYCH: Affect appropriate

## 2019-12-30 NOTE — ED PROVIDER NOTE - OBJECTIVE STATEMENT
43 yo F hx GERD presenting with sudden onset chest pain radiating to back described as tightness with, tingling and band like sensation to L arm. Pain associated with nausea/vomiting while in bed last night. Pain has been constant since then. Went to Dr. Chiang's office with echo suspicious for aortic dissection. No history of smoking or HTN. No CP or WOLF with walking. Grandfather with history of ruptured AAA. Mother with history of myocarditis and sudden death at age 29. Denies any other family history of sudden death. Denies URI sx.

## 2019-12-30 NOTE — H&P ADULT - NSICDXPASTSURGICALHX_GEN_ALL_CORE_FT
PAST SURGICAL HISTORY:  H/O colonoscopy 2007    H/O dilation and curettage x5 secondary to miscarriages    H/O endoscopy 2017    H/O sinus surgery 10/2005    History of  section 1005    S/P LASIK surgery of both eyes

## 2019-12-30 NOTE — ED PROVIDER NOTE - ATTENDING CONTRIBUTION TO CARE
Attending MD Harris:  I personally have seen and examined this patient.  Resident note reviewed and agree on plan of care and except where noted.  See HPI, PE, and MDM for details.

## 2019-12-30 NOTE — ED ADULT NURSE NOTE - NSIMPLEMENTINTERV_GEN_ALL_ED
Implemented All Universal Safety Interventions:  [place] to call system. Call bell, personal items and telephone within reach. Instruct patient to call for assistance. Room bathroom lighting operational. Non-slip footwear when patient is off stretcher. Physically safe environment: no spills, clutter or unnecessary equipment. Stretcher in lowest position, wheels locked, appropriate side rails in place.

## 2019-12-30 NOTE — H&P ADULT - NSICDXPASTMEDICALHX_GEN_ALL_CORE_FT
PAST MEDICAL HISTORY:  Cholesteatoma of mastoid, right ear     GERD (gastroesophageal reflux disease)     Obesity (BMI 30-39.9)     SHOBHA (obstructive sleep apnea)

## 2019-12-30 NOTE — H&P ADULT - NSHPLABSRESULTS_GEN_ALL_CORE
WBC 9.2.   Hgb 10.9.  Platelets of 222K.    INR 1.0    EKG tracing reviewed and repeated with NSR at 95.    K+ 3.5.   Random glucose of 130.    Cr 0.7    HS troponin 6>>6.    Chest radiograph reviewed with no infiltrate or effusion. WBC 9.2.   Hgb 10.9.  Platelets of 222K.    INR 1.0    EKG tracing reviewed and repeated with NSR at 95.    K+ 3.5.   Random glucose of 130.    Cr 0.7    HS troponin 6>>6.    Chest radiograph reviewed with no infiltrate or effusion.    CTT angiogram trunk with NORMAL aorta, no PE.  NO coronary calcification but with mid LAD MYOCARDIAL bridge  (this was reviewed with patient / spouse)

## 2019-12-30 NOTE — H&P ADULT - ASSESSMENT
NIGHT HOSPITALIST: NIGHT HOSPITALIST:   NO aortic dissection on CTT angiogram.   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   Will temporarily HOLD ASA and statin with (albeit limited views on CTT angio) with no evidence of atherosclerosis.  Would clarify with patient's obstetrician patient's history of 4 prior spontaneous fetal loss before the C section delivery>>unclear if the myocardial bridge was clinically silent until current presentation.    I reviewed above with Dr. DEE Post, covering for Dr. Ivy, of cardiology.   Patient will likely need a cardiac catheterization to assess extent of the myocardial bridge.    Will check HgbA1C.

## 2019-12-30 NOTE — H&P ADULT - PROBLEM SELECTOR PLAN 1
See above.   Suspect anatomic anomaly resulting in patient's intermittent ischaemic symptoms.   NO nitrates.   Low dose beta blocker.   Reviewed above with cardiology.

## 2019-12-30 NOTE — ED ADULT NURSE NOTE - OBJECTIVE STATEMENT
45 y/o female c/o chest pain starting last night and tightness/tingling radiating down L arm. Pt currently experiencing nausea upon arrival and 7/10 chest pain radiating to back. Last night into today, pt reports having heartburn and GERD symptoms that are worse than usual. Pt went to doctor today where she had an echo done showing aortic dissection, referred to ED for evaluation and intervention. PMH of GERD, sleep apnea. Denies dizziness, headache, fever, chills, vomiting, cough, urinary or bowel symptoms. IV placed via ultrasound by MD. Pt to go to CT scan STAT.

## 2019-12-31 LAB
ANION GAP SERPL CALC-SCNC: 14 MMOL/L — SIGNIFICANT CHANGE UP (ref 5–17)
BASOPHILS # BLD AUTO: 0.02 K/UL — SIGNIFICANT CHANGE UP (ref 0–0.2)
BASOPHILS NFR BLD AUTO: 0.3 % — SIGNIFICANT CHANGE UP (ref 0–2)
BUN SERPL-MCNC: 18 MG/DL — SIGNIFICANT CHANGE UP (ref 7–23)
CALCIUM SERPL-MCNC: 9.1 MG/DL — SIGNIFICANT CHANGE UP (ref 8.4–10.5)
CHLORIDE SERPL-SCNC: 100 MMOL/L — SIGNIFICANT CHANGE UP (ref 96–108)
CO2 SERPL-SCNC: 25 MMOL/L — SIGNIFICANT CHANGE UP (ref 22–31)
CREAT SERPL-MCNC: 0.65 MG/DL — SIGNIFICANT CHANGE UP (ref 0.5–1.3)
EOSINOPHIL # BLD AUTO: 0.08 K/UL — SIGNIFICANT CHANGE UP (ref 0–0.5)
EOSINOPHIL NFR BLD AUTO: 1.3 % — SIGNIFICANT CHANGE UP (ref 0–6)
GLUCOSE SERPL-MCNC: 116 MG/DL — HIGH (ref 70–99)
HBA1C BLD-MCNC: 5.9 % — HIGH (ref 4–5.6)
HCT VFR BLD CALC: 33.3 % — LOW (ref 34.5–45)
HGB BLD-MCNC: 11.3 G/DL — LOW (ref 11.5–15.5)
IMM GRANULOCYTES NFR BLD AUTO: 0.2 % — SIGNIFICANT CHANGE UP (ref 0–1.5)
LYMPHOCYTES # BLD AUTO: 2.12 K/UL — SIGNIFICANT CHANGE UP (ref 1–3.3)
LYMPHOCYTES # BLD AUTO: 33.7 % — SIGNIFICANT CHANGE UP (ref 13–44)
MCHC RBC-ENTMCNC: 29.5 PG — SIGNIFICANT CHANGE UP (ref 27–34)
MCHC RBC-ENTMCNC: 33.9 GM/DL — SIGNIFICANT CHANGE UP (ref 32–36)
MCV RBC AUTO: 86.9 FL — SIGNIFICANT CHANGE UP (ref 80–100)
MONOCYTES # BLD AUTO: 0.45 K/UL — SIGNIFICANT CHANGE UP (ref 0–0.9)
MONOCYTES NFR BLD AUTO: 7.2 % — SIGNIFICANT CHANGE UP (ref 2–14)
NEUTROPHILS # BLD AUTO: 3.61 K/UL — SIGNIFICANT CHANGE UP (ref 1.8–7.4)
NEUTROPHILS NFR BLD AUTO: 57.3 % — SIGNIFICANT CHANGE UP (ref 43–77)
PLATELET # BLD AUTO: 214 K/UL — SIGNIFICANT CHANGE UP (ref 150–400)
POTASSIUM SERPL-MCNC: 3.5 MMOL/L — SIGNIFICANT CHANGE UP (ref 3.5–5.3)
POTASSIUM SERPL-SCNC: 3.5 MMOL/L — SIGNIFICANT CHANGE UP (ref 3.5–5.3)
RBC # BLD: 3.83 M/UL — SIGNIFICANT CHANGE UP (ref 3.8–5.2)
RBC # FLD: 12.8 % — SIGNIFICANT CHANGE UP (ref 10.3–14.5)
SODIUM SERPL-SCNC: 139 MMOL/L — SIGNIFICANT CHANGE UP (ref 135–145)
WBC # BLD: 6.29 K/UL — SIGNIFICANT CHANGE UP (ref 3.8–10.5)
WBC # FLD AUTO: 6.29 K/UL — SIGNIFICANT CHANGE UP (ref 3.8–10.5)

## 2019-12-31 PROCEDURE — 93010 ELECTROCARDIOGRAM REPORT: CPT

## 2019-12-31 PROCEDURE — 99152 MOD SED SAME PHYS/QHP 5/>YRS: CPT

## 2019-12-31 PROCEDURE — 93458 L HRT ARTERY/VENTRICLE ANGIO: CPT | Mod: 26

## 2019-12-31 RX ORDER — ACETAMINOPHEN 500 MG
650 TABLET ORAL ONCE
Refills: 0 | Status: COMPLETED | OUTPATIENT
Start: 2019-12-31 | End: 2019-12-31

## 2019-12-31 RX ORDER — ACETAMINOPHEN 500 MG
975 TABLET ORAL ONCE
Refills: 0 | Status: COMPLETED | OUTPATIENT
Start: 2019-12-31 | End: 2019-12-31

## 2019-12-31 RX ORDER — LANOLIN ALCOHOL/MO/W.PET/CERES
1 CREAM (GRAM) TOPICAL ONCE
Refills: 0 | Status: COMPLETED | OUTPATIENT
Start: 2019-12-31 | End: 2019-12-31

## 2019-12-31 RX ADMIN — PANTOPRAZOLE SODIUM 40 MILLIGRAM(S): 20 TABLET, DELAYED RELEASE ORAL at 06:01

## 2019-12-31 RX ADMIN — Medication 25 MILLIGRAM(S): at 06:02

## 2019-12-31 RX ADMIN — ENOXAPARIN SODIUM 40 MILLIGRAM(S): 100 INJECTION SUBCUTANEOUS at 06:01

## 2019-12-31 RX ADMIN — Medication 650 MILLIGRAM(S): at 14:23

## 2019-12-31 RX ADMIN — Medication 975 MILLIGRAM(S): at 06:45

## 2019-12-31 RX ADMIN — Medication 650 MILLIGRAM(S): at 13:23

## 2019-12-31 RX ADMIN — CITALOPRAM 40 MILLIGRAM(S): 10 TABLET, FILM COATED ORAL at 11:04

## 2019-12-31 RX ADMIN — Medication 975 MILLIGRAM(S): at 07:45

## 2019-12-31 NOTE — CONSULT NOTE ADULT - SUBJECTIVE AND OBJECTIVE BOX
CHIEF COMPLAINT: Chest Pain    HPI:  45 y/o F patient with a history of presumed GERD, depression, who I saw in the office yesterday with new sudden onset of substernal chest pressure with LEFT arm pain which she described as squeezing and tightness not clearly related to activity nor meals.  Associated symptoms included diaphoresis, nausea and shortness of breath.  Echo in the office revealed a possible flap in the ascending aorta.    Patient with rare Librax but symptoms with no improvement.  Patient with presumed GERD and was self medicating with a PPI with no relief, and was sent from my office for possible aortic dissection. CTA of chest revealed no evidence for an aortic dissection but a myocardial bridge in the LAD.      PAST MEDICAL & SURGICAL HISTORY:  GERD (gastroesophageal reflux disease)  Obesity (BMI 30-39.9)  SHOBHA (obstructive sleep apnea)  Cholesteatoma of mastoid, right ear  S/P LASIK surgery of both eyes:   H/O endoscopy: 2017  H/O colonoscopy: 2007  H/O sinus surgery: 10/2005  History of  section: 1005  H/O dilation and curettage: x5 secondary to miscarriages      Allergies    penicillin (Rash)    Intolerances        SOCIAL HISTORY    Smoking Hx: None  ETOH Hx: None  Marital Status:   Occupational Hx: Medical Assistant    FAMILY HISTORY:  Type 2 diabetes mellitus  Family history of heart disease  Family history of MI (myocardial infarction)  Family history of breast cancer in sister (Sibling): 39 y/o sister  Maternal grandfather had an aortic dissection at age 72  Mother  at age 29 of viral cardiomyopathy      MEDICATIONS:  citalopram 40 milliGRAM(s) Oral daily  enoxaparin Injectable 40 milliGRAM(s) SubCutaneous every 24 hours  metoprolol succinate ER 25 milliGRAM(s) Oral daily  pantoprazole    Tablet 40 milliGRAM(s) Oral before breakfast      REVIEW OF SYSTEMS:    CONSTITUTIONAL: No weakness, fevers or chills  EYES/ENT: No visual changes;  No vertigo or throat pain   NECK: No pain or stiffness  RESPIRATORY: No cough, wheezing, hemoptysis; No shortness of breath  CARDIOVASCULAR: No chest pain or palpitations  GASTROINTESTINAL: No abdominal or epigastric pain. No nausea, vomiting, or hematemesis; No diarrhea or constipation. No melena or hematochezia.  GENITOURINARY: No dysuria, frequency or hematuria  NEUROLOGICAL: No numbness or weakness  SKIN: No itching, burning, rashes, or lesions   All other review of systems is negative unless indicated above    Vital Signs Last 24 Hrs  T(C): 36.8 (31 Dec 2019 05:59), Max: 36.8 (30 Dec 2019 16:17)  T(F): 98.3 (31 Dec 2019 05:59), Max: 98.3 (30 Dec 2019 16:17)  HR: 77 (31 Dec 2019 05:59) (77 - 115)  BP: 102/67 (31 Dec 2019 05:59) (102/67 - 133/87)  BP(mean): 82 (31 Dec 2019 00:42) (82 - 82)  RR: 18 (31 Dec 2019 05:59) (14 - 18)  SpO2: 98% (31 Dec 2019 05:59) (89% - 100%)    I&O's Summary      PHYSICAL EXAM:    Constitutional: NAD, awake and alert, well-developed  HEENT: PERR, EOMI  Neck: soft and supple, No LAD, No JVD  Respiratory: Breath sounds are clear bilaterally, No wheezing, rales or rhonchi  Cardiovascular: Regular rate and rhythm, normal S1 and S2,  no murmurs, gallops or rubs  Gastrointestinal: Bowel Sounds present, soft, nontender.   Extremities: No peripheral edema. No clubbing or cyanosis.  Vascular: 2+ peripheral pulses  Neurological: A/O x 3, no focal deficits  Musculoskeletal: no calf tenderness.  Skin: No rashes.      LABS: All Labs Reviewed:                        10.9   9.23  )-----------( 222      ( 30 Dec 2019 16:59 )             33.2     31 Dec 2019 06:31    139    |  100    |  18     ----------------------------<  116    3.5     |  25     |  0.65   30 Dec 2019 16:59    137    |  99     |  18     ----------------------------<  130    3.5     |  24     |  0.71     Ca    9.1        31 Dec 2019 06:31  Ca    9.5        30 Dec 2019 16:59  Mg     1.9       30 Dec 2019 16:59    TPro  7.8    /  Alb  4.4    /  TBili  0.2    /  DBili  x      /  AST  12     /  ALT  19     /  AlkPhos  106    30 Dec 2019 16:59    PT/INR - ( 30 Dec 2019 16:59 )   PT: 12.0 sec;   INR: 1.04 ratio         PTT - ( 30 Dec 2019 16:59 )  PTT:32.4 sec  Blood Culture:     CARDIAC MARKERS:  Troponin T, High Sensitivity Result: <6 (12.30.19 @ 22:58)    Troponin T, High Sensitivity Result: <6 (12.30.19 @ 18:29)      Troponin T, High Sensitivity (12.30.19 @ 16:59)    Troponin T, High Sensitivity Result: <6: Rapid upward or downward changes in high-sensitivity troponin levels  suggest acute myocardial injury. Renal impairment may cause sustained  troponin elevations.  Normal: <6 - 14 ng/L      proBNP: Serum Pro-Brain Natriuretic Peptide: 21 pg/mL (12-30 @ 16:59)        Telemetry: NSR   RADIOLOGY/EKG: NSR, within normal limits    < from: CT Angio Chest w/ IV Cont (. @ 16:51) >  IMPRESSION:     Normal aorta. No pulmonary embolus.    No coronary calcification. The study is not tailored to evaluate the coronary artery disease. Mid LAD myocardial bridge.    < end of copied text >

## 2019-12-31 NOTE — CONSULT NOTE ADULT - ASSESSMENT
45 yo woman with chest pain which is still present and a myocardial bridge on CT scan. No evidence for pulmonary embolism.  Patient MAURO negative x 3.  For cardiac cath today.  Patient was tachycardic in the office yesterday to 115, now in the 80's on a beta blocker.  Further treatment based on the cardiac cath.

## 2019-12-31 NOTE — PROVIDER CONTACT NOTE (OTHER) - ACTION/TREATMENT ORDERED:
Per provider, continue to monitor pt. Pt to transfer to [facility] telepacked w ACLS RN. RN to continue to monitor pt, contact provider if condition changes.

## 2020-01-01 ENCOUNTER — TRANSCRIPTION ENCOUNTER (OUTPATIENT)
Age: 45
End: 2020-01-01

## 2020-01-01 VITALS
DIASTOLIC BLOOD PRESSURE: 57 MMHG | SYSTOLIC BLOOD PRESSURE: 98 MMHG | RESPIRATION RATE: 18 BRPM | OXYGEN SATURATION: 96 % | HEART RATE: 71 BPM | TEMPERATURE: 99 F

## 2020-01-01 LAB
ANION GAP SERPL CALC-SCNC: 13 MMOL/L — SIGNIFICANT CHANGE UP (ref 5–17)
BUN SERPL-MCNC: 12 MG/DL — SIGNIFICANT CHANGE UP (ref 7–23)
CALCIUM SERPL-MCNC: 9.1 MG/DL — SIGNIFICANT CHANGE UP (ref 8.4–10.5)
CHLORIDE SERPL-SCNC: 98 MMOL/L — SIGNIFICANT CHANGE UP (ref 96–108)
CO2 SERPL-SCNC: 24 MMOL/L — SIGNIFICANT CHANGE UP (ref 22–31)
CREAT SERPL-MCNC: 0.65 MG/DL — SIGNIFICANT CHANGE UP (ref 0.5–1.3)
GLUCOSE SERPL-MCNC: 142 MG/DL — HIGH (ref 70–99)
MAGNESIUM SERPL-MCNC: 2.1 MG/DL — SIGNIFICANT CHANGE UP (ref 1.6–2.6)
POTASSIUM SERPL-MCNC: 3.6 MMOL/L — SIGNIFICANT CHANGE UP (ref 3.5–5.3)
POTASSIUM SERPL-SCNC: 3.6 MMOL/L — SIGNIFICANT CHANGE UP (ref 3.5–5.3)
SODIUM SERPL-SCNC: 135 MMOL/L — SIGNIFICANT CHANGE UP (ref 135–145)

## 2020-01-01 PROCEDURE — 80048 BASIC METABOLIC PNL TOTAL CA: CPT

## 2020-01-01 PROCEDURE — 85014 HEMATOCRIT: CPT

## 2020-01-01 PROCEDURE — C1887: CPT

## 2020-01-01 PROCEDURE — 84132 ASSAY OF SERUM POTASSIUM: CPT

## 2020-01-01 PROCEDURE — 83735 ASSAY OF MAGNESIUM: CPT

## 2020-01-01 PROCEDURE — 83605 ASSAY OF LACTIC ACID: CPT

## 2020-01-01 PROCEDURE — 71046 X-RAY EXAM CHEST 2 VIEWS: CPT

## 2020-01-01 PROCEDURE — 99152 MOD SED SAME PHYS/QHP 5/>YRS: CPT

## 2020-01-01 PROCEDURE — C1760: CPT

## 2020-01-01 PROCEDURE — 84702 CHORIONIC GONADOTROPIN TEST: CPT

## 2020-01-01 PROCEDURE — 96375 TX/PRO/DX INJ NEW DRUG ADDON: CPT

## 2020-01-01 PROCEDURE — 82803 BLOOD GASES ANY COMBINATION: CPT

## 2020-01-01 PROCEDURE — 96374 THER/PROPH/DIAG INJ IV PUSH: CPT

## 2020-01-01 PROCEDURE — C1769: CPT

## 2020-01-01 PROCEDURE — 84484 ASSAY OF TROPONIN QUANT: CPT

## 2020-01-01 PROCEDURE — 99291 CRITICAL CARE FIRST HOUR: CPT | Mod: 25

## 2020-01-01 PROCEDURE — 36000 PLACE NEEDLE IN VEIN: CPT | Mod: RT,XU

## 2020-01-01 PROCEDURE — 82435 ASSAY OF BLOOD CHLORIDE: CPT

## 2020-01-01 PROCEDURE — 99238 HOSP IP/OBS DSCHRG MGMT 30/<: CPT

## 2020-01-01 PROCEDURE — 82330 ASSAY OF CALCIUM: CPT

## 2020-01-01 PROCEDURE — 85027 COMPLETE CBC AUTOMATED: CPT

## 2020-01-01 PROCEDURE — 84295 ASSAY OF SERUM SODIUM: CPT

## 2020-01-01 PROCEDURE — C1894: CPT

## 2020-01-01 PROCEDURE — 93458 L HRT ARTERY/VENTRICLE ANGIO: CPT

## 2020-01-01 PROCEDURE — 82947 ASSAY GLUCOSE BLOOD QUANT: CPT

## 2020-01-01 PROCEDURE — 85610 PROTHROMBIN TIME: CPT

## 2020-01-01 PROCEDURE — 71275 CT ANGIOGRAPHY CHEST: CPT

## 2020-01-01 PROCEDURE — 93005 ELECTROCARDIOGRAM TRACING: CPT

## 2020-01-01 PROCEDURE — 74174 CTA ABD&PLVS W/CONTRAST: CPT

## 2020-01-01 PROCEDURE — 82553 CREATINE MB FRACTION: CPT

## 2020-01-01 PROCEDURE — 80053 COMPREHEN METABOLIC PANEL: CPT

## 2020-01-01 PROCEDURE — 85730 THROMBOPLASTIN TIME PARTIAL: CPT

## 2020-01-01 PROCEDURE — 82550 ASSAY OF CK (CPK): CPT

## 2020-01-01 PROCEDURE — 83880 ASSAY OF NATRIURETIC PEPTIDE: CPT

## 2020-01-01 PROCEDURE — 83036 HEMOGLOBIN GLYCOSYLATED A1C: CPT

## 2020-01-01 RX ORDER — DEXTROMETHORPHAN HYDROBROMIDE AND PROMETHAZINE HYDROCHLORIDE 15; 6.25 MG/5ML; MG/5ML
5 SYRUP ORAL
Qty: 0 | Refills: 0 | DISCHARGE

## 2020-01-01 RX ORDER — METOPROLOL TARTRATE 50 MG
1 TABLET ORAL
Qty: 30 | Refills: 0
Start: 2020-01-01 | End: 2020-01-30

## 2020-01-01 RX ORDER — ACETAMINOPHEN 500 MG
650 TABLET ORAL ONCE
Refills: 0 | Status: COMPLETED | OUTPATIENT
Start: 2020-01-01 | End: 2020-01-01

## 2020-01-01 RX ORDER — ONDANSETRON 8 MG/1
4 TABLET, FILM COATED ORAL ONCE
Refills: 0 | Status: COMPLETED | OUTPATIENT
Start: 2020-01-01 | End: 2020-01-01

## 2020-01-01 RX ADMIN — Medication 25 MILLIGRAM(S): at 05:10

## 2020-01-01 RX ADMIN — ENOXAPARIN SODIUM 40 MILLIGRAM(S): 100 INJECTION SUBCUTANEOUS at 05:09

## 2020-01-01 RX ADMIN — PANTOPRAZOLE SODIUM 40 MILLIGRAM(S): 20 TABLET, DELAYED RELEASE ORAL at 05:10

## 2020-01-01 RX ADMIN — CITALOPRAM 40 MILLIGRAM(S): 10 TABLET, FILM COATED ORAL at 11:04

## 2020-01-01 RX ADMIN — ONDANSETRON 4 MILLIGRAM(S): 8 TABLET, FILM COATED ORAL at 04:10

## 2020-01-01 RX ADMIN — Medication 650 MILLIGRAM(S): at 04:35

## 2020-01-01 RX ADMIN — Medication 650 MILLIGRAM(S): at 05:35

## 2020-01-01 NOTE — DISCHARGE NOTE PROVIDER - HOSPITAL COURSE
43 y/o F with a history of presumed GERD, depression with the patient self referring to [facility] following referral by her cardiologist above--patient with symptoms from last night of substernal chest pressure with LEFT arm pain not clearly related to activity or meals. Patient took Librax for symptoms with no improvement.  Patient with presumed GERD and was self medicating with a PPI with no relief, and was sent from the cardiology office for concern for aortic dissection.        Patient with myocardial bridge on CT scan. No evidence for pulmonary embolism.  Patient MAURO negative x 3. Cardiac cath done last night revealing no evidence for CAD and a mild myocardial bridge.        D/C on metoprolol succinate 25 mg qd    DCP with medication reconciliation discussed with Dr. Ortiz and Dr. Ivy.     Patient cleared for discharge home without skilled needs.     Follow up with Dr. Ivy tomorrow as scheduled.

## 2020-01-01 NOTE — DISCHARGE NOTE NURSING/CASE MANAGEMENT/SOCIAL WORK - PATIENT PORTAL LINK FT
You can access the FollowMyHealth Patient Portal offered by [facility] by registering at the following website: [email or web address]. By joining [facility]’s FollowMyHealth portal, you will also be able to view your health information using other applications (apps) compatible with our system.

## 2020-01-01 NOTE — DISCHARGE NOTE PROVIDER - NSDCMRMEDTOKEN_GEN_ALL_CORE_FT
aspirin 325 mg oral tablet: 1 tab(s) orally once a day  citalopram 40 mg oral tablet: 1 tab(s) orally once a day  metoprolol succinate 25 mg oral tablet, extended release: 1 tab(s) orally once a day  NexIUM 24HR 20 mg oral delayed release capsule: 1 cap(s) orally once a day

## 2020-01-01 NOTE — DISCHARGE NOTE PROVIDER - CARE PROVIDER_API CALL
Anmol Ivy (MD)  Cardiovascular Disease  [address], [address]  [place], [place] [id number]  Phone: [phone]  Fax: [phone]  Follow Up Time:

## 2020-01-01 NOTE — PROGRESS NOTE ADULT - ASSESSMENT
chest pain   Myocardial bridge.  likely  anatomic anomaly resulting in patient's intermittent symptoms     Low dose beta blocker.     cath today  trops neg       ·  Problem: Gastroesophageal reflux disease, esophagitis presence not specified     PP i
43 yo woman with chest pain which is still present and a myocardial bridge on CT scan. No evidence for pulmonary embolism.  Patient MAURO negative x 3. Cardiac cath done last night revealing no evidence for CAD and a mild myocardial bridge.  OK for discharge today.  Will follow up in the office tomorrow.  D/C on metoprolol succinate 25 mg qd
chest pain   Myocardial bridge.  likely  anatomic anomaly resulting in patient's intermittent symptoms     Low dose beta blocker.     cathdone   prelim as patent vessels  trops neg   cards f/u  if cleared  d/c home       ·  Problem: Gastroesophageal reflux disease, esophagitis presence not specified     PP i

## 2020-01-01 NOTE — PROGRESS NOTE ADULT - SUBJECTIVE AND OBJECTIVE BOX
CHIEF COMPLAINT:Patient is a 44y old  Female who presents with a chief complaint of Substernal chest pressure, LEFT arm pain since last night. (31 Dec 2019 08:27)    	        PAST MEDICAL & SURGICAL HISTORY:  GERD (gastroesophageal reflux disease)  Obesity (BMI 30-39.9)  SHOBHA (obstructive sleep apnea)  Cholesteatoma of mastoid, right ear  S/P LASIK surgery of both eyes:   H/O endoscopy: 2017  H/O colonoscopy: 2007  H/O sinus surgery: 10/2005  History of  section: 1005  H/O dilation and curettage: x5 secondary to miscarriages          REVIEW OF SYSTEMS:  CONSTITUTIONAL: No fever, weight loss, or fatigue  EYES: No eye pain, visual disturbances, or discharge  NECK: No pain or stiffness  RESPIRATORY: No cough, wheezing, chills or hemoptysis; No Shortness of Breath  CARDIOVASCULAR: No chest pain today   GASTROINTESTINAL: No abdominal or epigastric pain. No nausea, vomiting, or hematemesis; No diarrhea or constipation. No melena or hematochezia.  GENITOURINARY: No dysuria, frequency, hematuria, or incontinence  NEUROLOGICAL: No headaches, memory loss, loss of strength, numbness, or tremors    MUSCULOSKELETAL: No joint pain or swelling; No muscle, back, or extremity pain    Medications:  MEDICATIONS  (STANDING):  citalopram 40 milliGRAM(s) Oral daily  enoxaparin Injectable 40 milliGRAM(s) SubCutaneous every 24 hours  metoprolol succinate ER 25 milliGRAM(s) Oral daily  pantoprazole    Tablet 40 milliGRAM(s) Oral before breakfast    MEDICATIONS  (PRN):    	    PHYSICAL EXAM:  T(C): 36.8 (19 @ 05:59), Max: 36.8 (19 @ 16:17)  HR: 77 (19 @ 05:59) (77 - 115)  BP: 102/67 (19 @ 05:59) (102/67 - 133/87)  RR: 18 (19 @ 05:59) (14 - 18)  SpO2: 98% (19 @ 05:59) (89% - 100%)  Wt(kg): --  I&O's Summary      Appearance: Normal	  HEENT:   Normal oral mucosa, PERRL, EOMI	  Lymphatic: No lymphadenopathy  Cardiovascular: Normal S1 S2, No JVD, No murmurs, No edema  Respiratory: Lungs clear to auscultation	  Psychiatry: A & O x 3, Mood & affect appropriate  Gastrointestinal:  Soft, Non-tender, + BS	  Skin: No rashes, No ecchymoses, No cyanosis	  Neurologic: Non-focal  Extremities: Normal range of motion, No clubbing, cyanosis or edema  Vascular: Peripheral pulses palpable 2+ bilaterally    TELEMETRY: 	    ECG:  	  RADIOLOGY:  OTHER: 	  	  LABS:	 	    CARDIAC MARKERS:  CARDIAC MARKERS ( 30 Dec 2019 22:58 )  x     / x     / 73 U/L / x     / 2.4 ng/mL                                11.3   6.29  )-----------( 214      ( 31 Dec 2019 08:52 )             33.3         139  |  100  |  18  ----------------------------<  116<H>  3.5   |  25  |  0.65    Ca    9.1      31 Dec 2019 06:31  Mg     1.9         TPro  7.8  /  Alb  4.4  /  TBili  0.2  /  DBili  x   /  AST  12  /  ALT  19  /  AlkPhos  106      proBNP: Serum Pro-Brain Natriuretic Peptide: 21 pg/mL ( @ 16:59)    Lipid Profile:   HgA1c: Hemoglobin A1C, Whole Blood: 5.9 % ( @ 08:52)    TSH:
CHIEF COMPLAINT:Patient is a 44y old  Female who presents with a chief complaint of Substernal chest pressure, LEFT arm pain since last night. (31 Dec 2019 10:53)    	        PAST MEDICAL & SURGICAL HISTORY:  GERD (gastroesophageal reflux disease)  Obesity (BMI 30-39.9)  SHOBHA (obstructive sleep apnea)  Cholesteatoma of mastoid, right ear  S/P LASIK surgery of both eyes:   H/O endoscopy: 2017  H/O colonoscopy: 2007  H/O sinus surgery: 10/2005  History of  section: 1005  H/O dilation and curettage: x5 secondary to miscarriages          REVIEW OF SYSTEMS:  CONSTITUTIONAL: No fever, weight loss, or fatigue  EYES: No eye pain, visual disturbances, or discharge  NECK: No pain or stiffness  RESPIRATORY: No cough, wheezing, chills or hemoptysis; No Shortness of Breath  CARDIOVASCULAR: No chest pain, palpitations, passing out, dizziness, or leg swelling  GASTROINTESTINAL: No abdominal or epigastric pain. No nausea, vomiting, or hematemesis; No diarrhea or constipation. No melena or hematochezia.  GENITOURINARY: No dysuria, frequency, hematuria, or incontinence  NEUROLOGICAL: No headaches, memory loss, loss of strength, numbness, or tremors  SKIN: No itching, burning, rashes, or lesions   LYMPH Nodes: No enlarged glands  ENDOCRINE: No heat or cold intolerance; No hair loss  MUSCULOSKELETAL: No joint pain or swelling; No muscle, back, or extremity pain    Medications:  MEDICATIONS  (STANDING):  citalopram 40 milliGRAM(s) Oral daily  enoxaparin Injectable 40 milliGRAM(s) SubCutaneous every 24 hours  metoprolol succinate ER 25 milliGRAM(s) Oral daily  pantoprazole    Tablet 40 milliGRAM(s) Oral before breakfast    MEDICATIONS  (PRN):    	    PHYSICAL EXAM:  T(C): 37 (20 @ 04:36), Max: 37.1 (19 @ 18:04)  HR: 71 (20 @ 04:36) (71 - 88)  BP: 98/57 (20 @ 04:36) (98/57 - 137/80)  RR: 18 (20 @ 04:36) (18 - 18)  SpO2: 96% (20 @ 04:36) (95% - 98%)  Wt(kg): --  I&O's Summary    31 Dec 2019 07:01  -  2020 07:00  --------------------------------------------------------  IN: 160 mL / OUT: 0 mL / NET: 160 mL        Appearance: Normal	  HEENT:   Normal oral mucosa, PERRL, EOMI	  Lymphatic: No lymphadenopathy  Cardiovascular: Normal S1 S2, No JVD, No murmurs, No edema  Respiratory: Lungs clear to auscultation	  Psychiatry: A & O x 3, Mood & affect appropriate  Gastrointestinal:  Soft, Non-tender, + BS	  Skin: No rashes, No ecchymoses, No cyanosis	  Neurologic: Non-focal  Extremities: Normal range of motion, No clubbing, cyanosis or edema  Vascular: Peripheral pulses palpable 2+ bilaterally    TELEMETRY: 	    ECG:  	  RADIOLOGY:  OTHER: 	  	  LABS:	 	    CARDIAC MARKERS:  CARDIAC MARKERS ( 30 Dec 2019 22:58 )  x     / x     / 73 U/L / x     / 2.4 ng/mL                                11.3   6.29  )-----------( 214      ( 31 Dec 2019 08:52 )             33.3         135  |  98  |  12  ----------------------------<  142<H>  3.6   |  24  |  0.65    Ca    9.1      2020 06:58  Mg     2.1         TPro  7.8  /  Alb  4.4  /  TBili  0.2  /  DBili  x   /  AST  12  /  ALT  19  /  AlkPhos  106  30    proBNP:   Lipid Profile:   HgA1c:   TSH:
SUBJECTIVE: The patient denies chest pain, shortness of breath, arm pain or jaw pain, dizziness or palpitations.    	  MEDICATIONS:  metoprolol succinate ER 25 milliGRAM(s) Oral daily        citalopram 40 milliGRAM(s) Oral daily    pantoprazole    Tablet 40 milliGRAM(s) Oral before breakfast      enoxaparin Injectable 40 milliGRAM(s) SubCutaneous every 24 hours      REVIEW OF SYSTEMS:    CONSTITUTIONAL: No fever, weight loss, or fatigue  EYES: No eye pain, visual disturbances, or discharge  NECK: No pain or stiffness  RESPIRATORY: No cough, wheezing, chills or hemoptysis; No Shortness of Breath  CARDIOVASCULAR: No chest pain, palpitations, dizziness, or leg swelling  GASTROINTESTINAL: No abdominal or epigastric pain. No nausea, vomiting, or hematemesis; No diarrhea or constipation. No melena or hematochezia.  GENITOURINARY: No dysuria, frequency, hematuria, or incontinence  NEUROLOGICAL: No headaches, memory loss, loss of strength, numbness, or tremors  SKIN: No itching, burning, rashes, or lesions   LYMPH Nodes: No enlarged glands  MUSCULOSKELETAL: No joint pain or swelling; No muscle, back, or extremity pain  All other review of systems are negative.  	  [ ] Unable to obtain    PHYSICAL EXAM:  T(C): 37 (01-01-20 @ 04:36), Max: 37.1 (12-31-19 @ 18:04)  HR: 71 (01-01-20 @ 04:36) (71 - 88)  BP: 98/57 (01-01-20 @ 04:36) (98/57 - 137/80)  RR: 18 (01-01-20 @ 04:36) (18 - 18)  SpO2: 96% (01-01-20 @ 04:36) (95% - 98%)  Wt(kg): --  I&O's Summary    31 Dec 2019 07:01  -  01 Jan 2020 07:00  --------------------------------------------------------  IN: 160 mL / OUT: 0 mL / NET: 160 mL          PHYSICAL EXAM    Appearance: Normal	  HEENT:   Normal oral mucosa, PERRL, EOMI	  NECK: Soft and supple, No LAD, No JVD  Cardiovascular: Regular Rate and Rhythm, Normal S1 S2, No murmurs, No clicks, gallops or rubs  Respiratory: Lungs clear to auscultation	  Gastrointestinal:  Soft, Non-tender, + BS	  Skin: No rashes, No ecchymoses, No cyanosis  Neurologic: Non-focal  Extremities: Right groin without bruit or hematoma  Vascular: Peripheral pulses palpable 2+ bilaterally    TELEMETRY: Normal sinus rhythm 70's to 90's	      LABS:	 	                            11.3   6.29  )-----------( 214      ( 31 Dec 2019 08:52 )             33.3     01-01    135  |  98  |  12  ----------------------------<  142<H>  3.6   |  24  |  0.65    Ca    9.1      01 Jan 2020 06:58  Mg     2.1     01-01    TPro  7.8  /  Alb  4.4  /  TBili  0.2  /  DBili  x   /  AST  12  /  ALT  19  /  AlkPhos  106  12-30

## 2020-01-01 NOTE — DISCHARGE NOTE PROVIDER - NSDCCPCAREPLAN_GEN_ALL_CORE_FT
PRINCIPAL DISCHARGE DIAGNOSIS  Diagnosis: Chest pain  Assessment and Plan of Treatment:   Cardiac cath done last night revealing no evidence for CAD and a mild myocardial bridge. No telemetry events.   Follow up with Dr. Ivy in the office tomorrow.   HOME CARE INSTRUCTIONS  For the next few days, avoid physical activities that bring on chest pain. Continue physical activities as directed.  Do not smoke.  Avoid drinking alcohol.   Only take over-the-counter or prescription medicine for pain, discomfort, or fever as directed by your caregiver.  SEEK IMMEDIATE MEDICAL CARE IF:  You have increased chest pain or pain that spreads to your arm, neck, jaw, back, or abdomen.   You develop shortness of breath, an increasing cough, or you are coughing up blood.  You have severe back or abdominal pain, feel nauseous, or vomit.  You develop severe weakness, fainting, or chills.  You have a fever.  THIS IS AN EMERGENCY. Do not wait to see if the pain will go away. Get medical help at once. Call your local emergency services

## 2020-01-01 NOTE — PROGRESS NOTE ADULT - REASON FOR ADMISSION
Substernal chest pressure, LEFT arm pain since last night.

## 2020-05-06 ENCOUNTER — APPOINTMENT (OUTPATIENT)
Dept: OTOLARYNGOLOGY | Facility: CLINIC | Age: 45
End: 2020-05-06

## 2020-11-21 ENCOUNTER — TRANSCRIPTION ENCOUNTER (OUTPATIENT)
Age: 45
End: 2020-11-21

## 2021-02-22 ENCOUNTER — TRANSCRIPTION ENCOUNTER (OUTPATIENT)
Age: 46
End: 2021-02-22

## 2021-12-07 NOTE — ASU DISCHARGE PLAN (ADULT/PEDIATRIC). - YOU WERE IN THE HOSPITAL FOR:
Pt discharged back home, reviewed discharged instructions with pt follow up care , pain control and return precautions discussed , pt verbalized understanding.  Pt ambulated out of the department with steady gait          Flor Whitman RN  12/06/21 8525 right mastoidectomy with ossicular chain reconstuction with facia graft

## 2023-02-23 ENCOUNTER — NON-APPOINTMENT (OUTPATIENT)
Age: 48
End: 2023-02-23

## 2023-02-27 ENCOUNTER — OUTPATIENT (OUTPATIENT)
Dept: OUTPATIENT SERVICES | Facility: HOSPITAL | Age: 48
LOS: 1 days | Discharge: HOME | End: 2023-02-27

## 2023-02-27 DIAGNOSIS — Z98.890 OTHER SPECIFIED POSTPROCEDURAL STATES: Chronic | ICD-10-CM

## 2023-02-27 DIAGNOSIS — Z98.891 HISTORY OF UTERINE SCAR FROM PREVIOUS SURGERY: Chronic | ICD-10-CM

## 2023-02-27 DIAGNOSIS — C50.919 MALIGNANT NEOPLASM OF UNSPECIFIED SITE OF UNSPECIFIED FEMALE BREAST: ICD-10-CM

## 2023-03-01 ENCOUNTER — NON-APPOINTMENT (OUTPATIENT)
Age: 48
End: 2023-03-01

## 2023-03-03 ENCOUNTER — NON-APPOINTMENT (OUTPATIENT)
Age: 48
End: 2023-03-03

## 2023-03-03 ENCOUNTER — APPOINTMENT (OUTPATIENT)
Age: 48
End: 2023-03-03
Payer: COMMERCIAL

## 2023-03-03 VITALS
RESPIRATION RATE: 18 BRPM | TEMPERATURE: 98.4 F | SYSTOLIC BLOOD PRESSURE: 112 MMHG | OXYGEN SATURATION: 98 % | BODY MASS INDEX: 38.74 KG/M2 | HEIGHT: 60 IN | DIASTOLIC BLOOD PRESSURE: 77 MMHG | HEART RATE: 101 BPM | WEIGHT: 197.31 LBS

## 2023-03-03 DIAGNOSIS — C50.912 MALIGNANT NEOPLASM OF UNSPECIFIED SITE OF LEFT FEMALE BREAST: ICD-10-CM

## 2023-03-03 PROCEDURE — 99205 OFFICE O/P NEW HI 60 MIN: CPT

## 2023-03-04 PROBLEM — C50.912 MALIGNANT NEOPLASM OF LEFT FEMALE BREAST, UNSPECIFIED ESTROGEN RECEPTOR STATUS, UNSPECIFIED SITE OF BREAST: Status: ACTIVE | Noted: 2023-03-04

## 2023-03-04 NOTE — CONSULT LETTER
[Dear  ___] : Dear  [unfilled], [Consult Letter:] : I had the pleasure of evaluating your patient, [unfilled]. [Please see my note below.] : Please see my note below. [Consult Closing:] : Thank you very much for allowing me to participate in the care of this patient.  If you have any questions, please do not hesitate to contact me. [Sincerely,] : Sincerely, [FreeTextEntry2] : Vidya Vaz MD [FreeTextEntry3] : JOE NAVARRETE M.D.\par Medical  Oncology\par [facility] \par [facility]\par [address]\par [place], [place] [address]\par Telephone: [phone]\par Fax: [phone]\par \par \par \par \par \par

## 2023-03-04 NOTE — HISTORY OF PRESENT ILLNESS
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): Left breast, 6:00,,N 1cm, 0.9 x 0.8 x 0.7 cm irregular hypoechoic mass with indistinct margins without vascularity suspicious for malignancy for which US guided core biopsy recommended\par 22: US guided core needle biopsy left breast: Infiltrating ductal carcinoma, G2–G3, ER 65%, OH 6% HER2 negative, Ki-67 10%\par 2022: Breast MRI-left breast 6:00 anterior, 2 x 2 x 2 cm lesion; additionally irregular enhancing mass to 9 mm, approximately 1.6 cm inferior and posterior from primary lesion; MRI guided biopsy recommended.\par 2022: Pelvic US: No ovarian masses\par 2022: Status post left partial mastectomy (2 sites) and SLNB; surgical pathology–moderately poorly differentiated infiltrating ductal carcinoma, 15 mm and 8 mm, present DCIS, margins negative, negative lymph nodes (0/2), \par Smaller lesion showed ER 80%, OH negative, HER2 negative, Ki-67 15-20%\par Larger lesion showed ER 30-40%, OH 2%, HER2 negative, Ki-67 15-20% , Oncotype DX 44 (larger lesion).stage I (mpT1c,N0)\par Per Oncotype RT-PCR , ER/OH negative.\par 2022: Mediport placement\par 2022- completes TC x 4\par  -10/18/2022: Completes RT right breast 4256 cGy/16 fractions followed by boost of 1000 cGy in 4 fractions\par 2022 left diagnostic mammogram/breast US ([facility])-benign findings\par 2022–start anastrozole with poor tolerance ( depressed, mood swings); offered exemestane\par 2023–s/p port catheter removal\par 2023: Laparoscopic salpingo-oophorectomy\par \par Patient is , lives with spouse and child, completely independent.  Had menarche at age 15, denies use OCP.  She is a never smoker, social alcohol use.\par Reports family history of hypercoagulability (mother  at age 31 secondary to blood clots, has a sister with breast cancer diagnosed at age 36, status post bilateral mastectomy, BRCA negative.  She also had 2 maternal great aunts with breast cancer, maternal grandfather with ruptured aneurysm, paternal grandmother  of ovarian cancer in her 70s.  Last seen in [place] on 2023 by Dr. Yury Post, patient is seeking 2nd oncologic opinion at [facility] .  Offered exemestane, not started yet. [FreeTextEntry1] : aromatase inhibitor

## 2023-03-04 NOTE — ASSESSMENT
[FreeTextEntry1] : Ms. GONZALEZ 's questions were answered to her satisfaction. \par She  was advised to contact the office should she have any additional questions or concerns. \par \par

## 2023-03-04 NOTE — PHYSICAL EXAM
[Fully active, able to carry on all pre-disease performance without restriction] : Status 0 - Fully active, able to carry on all pre-disease performance without restriction [Normal] : affect appropriate [de-identified] : Well-healed left periareolar and axillary incision; no palpable masses bilaterally, no nipple discharge

## 2023-08-05 NOTE — ASU PREOP CHECKLIST - AS BP NONINV SITE
Agree with housestaff exam, assessment, and plan unless otherwise stated. Patient evaluated and examined in my presence, case discussed with treatment team. As per protocol, I discussed available results of testing and plan of care with the patient/advocate, who agree to the plan, including anticipated benefits of the admission, studies, procedure. Note above reviewed, further edited for accuracy, and cosigned.  Face time for evaluation, education, and counseling was >50% of time spent on unit (minutes) x: 20 left upper arm

## 2024-07-21 NOTE — ED ADULT NURSE NOTE - ED STAT RN HANDOFF DETAILS
Handoff given to MARIBEL Barker. RN aware of PMH, reason for stay, and has no further questions.
---

## 2025-07-21 ENCOUNTER — APPOINTMENT (OUTPATIENT)
Dept: ORTHOPEDIC SURGERY | Facility: CLINIC | Age: 50
End: 2025-07-21